# Patient Record
Sex: MALE | URBAN - METROPOLITAN AREA
[De-identification: names, ages, dates, MRNs, and addresses within clinical notes are randomized per-mention and may not be internally consistent; named-entity substitution may affect disease eponyms.]

---

## 2021-01-05 ENCOUNTER — NURSE TRIAGE (OUTPATIENT)
Dept: OTHER | Facility: OTHER | Age: 21
End: 2021-01-05

## 2021-01-05 DIAGNOSIS — Z20.822 SUSPECTED COVID-19 VIRUS INFECTION: ICD-10-CM

## 2021-01-05 DIAGNOSIS — Z20.822 SUSPECTED COVID-19 VIRUS INFECTION: Primary | ICD-10-CM

## 2021-01-05 PROCEDURE — U0003 INFECTIOUS AGENT DETECTION BY NUCLEIC ACID (DNA OR RNA); SEVERE ACUTE RESPIRATORY SYNDROME CORONAVIRUS 2 (SARS-COV-2) (CORONAVIRUS DISEASE [COVID-19]), AMPLIFIED PROBE TECHNIQUE, MAKING USE OF HIGH THROUGHPUT TECHNOLOGIES AS DESCRIBED BY CMS-2020-01-R: HCPCS | Performed by: FAMILY MEDICINE

## 2021-01-05 NOTE — TELEPHONE ENCOUNTER
Reason for Disposition   [1] COVID-19 infection suspected by caller or triager AND [2] mild symptoms (cough, fever, or others) AND [0] no complications or SOB    Additional Information   [1] Symptoms of COVID-19 (e g , cough, fever, SOB, or others) AND [2] within 14 days of EXPOSURE (close contact) with diagnosed or suspected COVID-19 patient    Answer Assessment - Initial Assessment Questions  1  COVID-19 CLOSE CONTACT: "Who is the person with the confirmed or suspected COVID-19 infection that you were exposed to?"      friend  2  PLACE of CONTACT: "Where were you when you were exposed to COVID-19?" (e g , home, school, medical waiting room; which city?)      Doing various activities  3  TYPE of CONTACT: "How much contact was there?" (e g , sitting next to, live in same house, work in same office, same building)      Sitting around visiting  4  DURATION of CONTACT: "How long were you in contact with the COVID-19 patient?" (e g , a few seconds, passed by person, a few minutes, 15 minutes or longer, live with the patient)      hours  5  MASK: "Were you wearing a mask?" "Was the other person wearing a mask?" Note: wearing a mask reduces the risk of an otherwise close contact  denies  6  DATE of CONTACT: "When did you have contact with a COVID-19 patient?" (e g , how many days ago)      1/2/2021  7  COMMUNITY SPREAD: "Are there lots of cases of COVID-19 (community spread) where you live?" (See public health department website, if unsure)        widespread  8  SYMPTOMS: "Do you have any symptoms?" (e g , fever, cough, breathing difficulty, loss of taste or smell)      Sore throat, dry cough intermittently  10  HIGH RISK: "Do you have any heart or lung problems? Do you have a weak immune system?" (e g , heart failure, COPD, asthma, HIV positive, chemotherapy, renal failure, diabetes mellitus, sickle cell anemia, obesity)        denies  11   TRAVEL: "Have you traveled out of the country recently?" If so, "When and where?" Also ask about out-of-state travel, since the CDC has identified some high-risk cities for community spread in the [address]  Note: Travel becomes less relevant if there is widespread community transmission where the patient lives          denies    Protocols used: CORONAVIRUS (COVID-19)  DIAGNOSED OR SUSPECTED-ADULT-[place], CORONAVIRUS (COVID-19 ) EXPOSURE-ADULT-[place]

## 2021-01-05 NOTE — TELEPHONE ENCOUNTER
Regarding: COVID Test Request - slight cough, sore throat - exposure  ----- Message from Emy Gomez sent at 1/5/2021 11:46 AM EST -----  "One of my friends just tested positive " Pt also reports waking up a sore throat and slight cough

## 2021-01-07 LAB — SARS-COV-2 RNA SPEC QL NAA+PROBE: NOT DETECTED
